# Patient Record
Sex: MALE | Race: WHITE | NOT HISPANIC OR LATINO | Employment: FULL TIME | URBAN - METROPOLITAN AREA
[De-identification: names, ages, dates, MRNs, and addresses within clinical notes are randomized per-mention and may not be internally consistent; named-entity substitution may affect disease eponyms.]

---

## 2018-07-30 ENCOUNTER — HOSPITAL ENCOUNTER (EMERGENCY)
Facility: HOSPITAL | Age: 28
Discharge: HOME | End: 2018-07-30
Attending: EMERGENCY MEDICINE | Admitting: EMERGENCY MEDICINE

## 2018-07-30 VITALS
WEIGHT: 160 LBS | RESPIRATION RATE: 18 BRPM | OXYGEN SATURATION: 99 % | TEMPERATURE: 98.5 F | HEART RATE: 74 BPM | SYSTOLIC BLOOD PRESSURE: 124 MMHG | DIASTOLIC BLOOD PRESSURE: 58 MMHG

## 2018-07-30 DIAGNOSIS — K02.9 DENTAL CARIES: ICD-10-CM

## 2018-07-30 DIAGNOSIS — K02.9 PAIN DUE TO DENTAL CARIES: Primary | ICD-10-CM

## 2018-07-30 DIAGNOSIS — S02.5XXA CHIPPED TOOTH: ICD-10-CM

## 2018-07-30 PROCEDURE — 99282 EMERGENCY DEPT VISIT SF MDM: CPT

## 2018-07-30 PROCEDURE — 96372 THER/PROPH/DIAG INJ SC/IM: CPT

## 2018-07-30 RX ORDER — PENICILLIN V POTASSIUM 500 MG/1
500 TABLET ORAL 4 TIMES DAILY
Qty: 40 TABLET | Refills: 0 | Status: SHIPPED | OUTPATIENT
Start: 2018-07-30 | End: 2018-08-06

## 2018-07-30 RX ORDER — KETOROLAC TROMETHAMINE 30 MG/ML
15 INJECTION, SOLUTION INTRAMUSCULAR; INTRAVENOUS ONCE
Status: COMPLETED | OUTPATIENT
Start: 2018-07-30 | End: 2018-07-30

## 2018-07-30 RX ORDER — PENICILLIN V POTASSIUM 500 MG/1
500 TABLET ORAL 4 TIMES DAILY
Qty: 40 TABLET | Refills: 0 | OUTPATIENT
Start: 2018-07-30 | End: 2018-08-06

## 2018-07-30 RX ADMIN — KETOROLAC TROMETHAMINE 15 MG: 30 INJECTION, SOLUTION INTRAMUSCULAR at 18:18

## 2018-07-30 NOTE — ED ATTENDING ATTESTATION
Diane Loja MD, saw and evaluated the patient  I have discussed the patient with the resident/non-physician practitioner and agree with the resident's/non-physician practitioner's findings, Plan of Care, and MDM as documented in the resident's/non-physician practitioner's note, except where noted  All available labs and Radiology studies were reviewed  At this point I agree with the current assessment done in the Emergency Department    I have conducted an independent evaluation of this patient a history and physical is as follows:   Pt broke a tooth a couple of weeks ago pt started with pain in area 2 days ago PE: alert broke tooth with caries MDM: will start antibiotics and nsaids    Critical Care Time  CritCare Time    Procedures

## 2018-07-30 NOTE — DISCHARGE INSTRUCTIONS
Acute Dental Trauma   WHAT YOU NEED TO KNOW:   Acute dental trauma is a serious injury to one or more parts of your mouth  Your injury may include damage to any of your teeth, the tooth socket, the tooth root, or your jaw  You can also have injuries to the soft tissues of your mouth  These include your tongue, cheeks, gums, and lips  Severe injuries can expose the soft pulp inside the tooth  DISCHARGE INSTRUCTIONS:   Call 911 for any of the following:   · You have trouble breathing  Return to the emergency department immediately if:   · You lose one or more of your teeth, or your tooth moves out of place  · You have severe bleeding in your mouth that does not stop  Contact your healthcare provider if:   · You have a fever  · You have new symptoms, or your symptoms become worse  · You feel pain when air gets in contact with your damaged tooth  · You have tooth pain when you eat foods that are hot, cold, sweet, or sour  · Your tooth's color becomes darker  · You have questions or concern about your condition or care  Medicines: You may  need any of the following:  · Antibiotics  help treat or prevent a bacterial infection  · Acetaminophen  decreases pain and fever  It is available without a doctor's order  Ask how much to take and how often to take it  Follow directions  Read the labels of all other medicines you are using to see if they also contain acetaminophen, or ask your doctor or pharmacist  Acetaminophen can cause liver damage if not taken correctly  Do not use more than 4 grams (4,000 milligrams) total of acetaminophen in one day  · Take your medicine as directed  Contact your healthcare provider if you think your medicine is not helping or if you have side effects  Tell him or her if you are allergic to any medicine  Keep a list of the medicines, vitamins, and herbs you take  Include the amounts, and when and why you take them   Bring the list or the pill bottles to follow-up visits  Carry your medicine list with you in case of an emergency  Self-care:   · Apply ice  on your jaw or cheek for 15 to 20 minutes every hour or as directed  Use an ice pack, or put crushed ice in a plastic bag  Cover it with a towel  Ice helps prevent tissue damage and decreases swelling and pain  · Do not use your damaged tooth  Chewing food on your damaged tooth may put too much pressure on it and worsen your injury  · Eat soft foods or drink liquids  Soft foods and liquids may be easier to eat until your injury heals  Soft foods include applesauce, pudding, mashed potatoes, gelatin, or ice cream      · Keep your wounds clean  Use prescribed mouthwash as directed or gargle with a salt water solution  Mix 1 teaspoon of salt and 1 cup of warm water  You can also clean your wounds with hydrogen peroxide swabs  Ask your healthcare provider for more information on how to clean your wounds  · Wear protective gear when you play sports  Always wear a helmet and mouth guard that meet safety standards  These will prevent damage to your gums, teeth, and the bones that support your mouth  Follow up with your healthcare provider as directed:  Write down your questions so you remember to ask them during your visits  © 2017 [address] Information is for End User's use only and may not be sold, redistributed or otherwise used for commercial purposes  All illustrations and images included in CareNotes® are the copyrighted property of [employer]  or [employer]  The above information is an  only  It is not intended as medical advice for individual conditions or treatments  Talk to your doctor, nurse or pharmacist before following any medical regimen to see if it is safe and effective for you  Dental Caries   WHAT YOU NEED TO KNOW:   Dental caries are also called cavities  Cavities are caused by bacteria   When the bacteria in tooth plaque (sticky film) mix with certain types of carbohydrate, this creates acid  The acid breaks down areas of enamel, which covers the outside of a tooth  This creates a small hole in the tooth called a cavity  DISCHARGE INSTRUCTIONS:   Return to the emergency department if:   · Your face, jaw, cheek, eye, or neck begin to swell  Contact your dentist if:   · You have a fever  · Your tooth pain gets worse  · You have questions or concerns about your condition or care  Follow up with your dentist as directed:  Write down your questions so you remember to ask them during your visits  Prevent dental caries:   · Brush your teeth at least 2 times a day with fluoride toothpaste  · Use dental floss to clean between your teeth at least once a day  · Rinse your mouth with water or mouthwash after meals and snacks  · Chew sugarless gum after meals and snacks  · See your dentist regularly for dental cleanings and oral exams  © 2017 [address] is for End User's use only and may not be sold, redistributed or otherwise used for commercial purposes  All illustrations and images included in CareNotes® are the copyrighted property of [employer]  or [employer]  The above information is an  only  It is not intended as medical advice for individual conditions or treatments  Talk to your doctor, nurse or pharmacist before following any medical regimen to see if it is safe and effective for you

## 2018-07-30 NOTE — ED PROVIDER NOTES
History  Chief Complaint   Patient presents with    Dental Pain     pt with hx of dental caries and dental pain  pt having dental pain in the front of his mouth  Patient is a 17-year-old male presenting to the emergency department for 48 hour history of dental pain, states began yesterday morning, describes it as constant dull and aching in nature, without associated fever and chills  He states he has a history of dental caries, but does not have the money to see a dentist   He said the pain it is occurring where he broke a tooth a few weeks ago, and he has had intermittent pain since this event  He denies trismus, muffled voice, neck pain, malaise, no odynophagia, dysphagia  He took ibuprofen this morning, without alleviation of his pain  No significant past medical history, takes no medications  Current smoker, denies alcohol and drugs  Denies chest pain, shortness of breath, nausea, vomiting, diarrhea, melena, hematochezia, cough, genitourinary symptoms  History provided by:  Patient   used: No    Dental Pain   Location:  Lower  Quality:  Aching and dull  Severity:  Moderate  Onset quality:  Gradual  Duration:  2 days  Timing:  Constant  Progression:  Unchanged  Chronicity:  Chronic  Context: dental caries, dental fracture and poor dentition    Context: not abscess, cap still on, not crown fracture, not enamel fracture, filling intact, not intrusion, not malocclusion, not recent dental surgery and not trauma    Relieved by:  Nothing  Worsened by:  Cold food/drink and hot food/drink  Ineffective treatments:  NSAIDs  Associated symptoms: no congestion, no drooling, no facial swelling, no fever, no headaches, no neck pain and no oral lesions    Risk factors: lack of dental care and smoking    Risk factors: no cancer, no chewing tobacco use, no diabetes and no immunosuppression        None       History reviewed  No pertinent past medical history      Past Surgical History: Procedure Laterality Date    APPENDECTOMY         History reviewed  No pertinent family history  I have reviewed and agree with the history as documented  Social History   Substance Use Topics    Smoking status: Current Every Day Smoker     Packs/day: 0 50     Types: Cigarettes    Smokeless tobacco: Never Used    Alcohol use No        Review of Systems   Constitutional: Negative  Negative for appetite change, chills, diaphoresis, fatigue, fever and unexpected weight change  HENT: Positive for dental problem  Negative for congestion, drooling, ear discharge, ear pain, facial swelling, hearing loss, mouth sores, nosebleeds, postnasal drip, rhinorrhea, sinus pain, sinus pressure, sore throat, tinnitus, trouble swallowing and voice change  Eyes: Negative  Negative for photophobia, pain, redness and visual disturbance  Respiratory: Negative  Negative for cough, choking, chest tightness, shortness of breath, wheezing and stridor  Cardiovascular: Negative  Negative for chest pain, palpitations and leg swelling  Gastrointestinal: Negative  Negative for abdominal pain, diarrhea, nausea and vomiting  Endocrine: Negative  Genitourinary: Negative  Musculoskeletal: Negative for back pain, joint swelling, myalgias, neck pain and neck stiffness  Skin: Negative  Allergic/Immunologic: Negative  Neurological: Negative for dizziness, facial asymmetry, weakness, light-headedness, numbness and headaches  Hematological: Negative  Psychiatric/Behavioral: Negative          Physical Exam  ED Triage Vitals [07/30/18 1724]   Temperature Pulse Respirations Blood Pressure SpO2   98 5 °F (36 9 °C) 62 18 117/62 99 %      Temp Source Heart Rate Source Patient Position - Orthostatic VS BP Location FiO2 (%)   Oral Monitor Lying Right arm --      Pain Score       8           Orthostatic Vital Signs  Vitals:    07/30/18 1724 07/30/18 1844   BP: 117/62 124/58   Pulse: 62 74   Patient Position - Orthostatic VS: Lying Sitting       Physical Exam   Constitutional: He is oriented to person, place, and time  He appears well-developed and well-nourished  No distress  HENT:   Head: Normocephalic and atraumatic  Right Ear: External ear normal    Left Ear: External ear normal    Nose: Nose normal    Mouth/Throat: Uvula is midline, oropharynx is clear and moist and mucous membranes are normal  He does not have dentures  No oral lesions  No trismus in the jaw  Abnormal dentition  Dental caries present  No dental abscesses, uvula swelling or lacerations  No oropharyngeal exudate or tonsillar abscesses  No tonsillar exudate  Poor dentition  Patient's right lower canine eroded down to gum line, brown discoloration without blood, or abscess  Dental caries   Eyes: Conjunctivae and EOM are normal  Pupils are equal, round, and reactive to light  Right eye exhibits no discharge  Left eye exhibits no discharge  No scleral icterus  Neck: Normal range of motion  No JVD present  No tracheal deviation present  No thyromegaly present  Cardiovascular: Normal rate, regular rhythm, normal heart sounds and intact distal pulses  Exam reveals no gallop and no friction rub  No murmur heard  Pulmonary/Chest: Effort normal and breath sounds normal  No stridor  No respiratory distress  He has no wheezes  He has no rales  He exhibits no tenderness  Abdominal: Soft  He exhibits no distension and no mass  There is no tenderness  There is no guarding  Musculoskeletal: Normal range of motion  He exhibits no edema, tenderness or deformity  Lymphadenopathy:     He has no cervical adenopathy  Neurological: He is alert and oriented to person, place, and time  No cranial nerve deficit  He exhibits normal muscle tone  Skin: Skin is warm and dry  Capillary refill takes less than 2 seconds  No rash noted  He is not diaphoretic  No erythema  No pallor  Psychiatric: He has a normal mood and affect   His behavior is normal    Nursing note and vitals reviewed  ED Medications  Medications   ketorolac (TORADOL) injection 15 mg (15 mg Intramuscular Given 7/30/18 1818)       Diagnostic Studies  Results Reviewed     None                 No orders to display         Procedures  Procedures      Phone Consults  ED Phone Contact    ED Course                               MDM  Number of Diagnoses or Management Options  Chipped tooth: new and does not require workup  Dental caries: new and does not require workup  Pain due to dental caries: new and does not require workup  Diagnosis management comments: 1  Patient presenting to the emergency department for dental pain  Patient has poor dentition and dental caries throughout  No abscesses on physical exam, patient has no trismus, odynophagia, dysphagia, dyspnea, fever or chills, no neck pain  Patient's physical exam findings and symptoms consistent with dental pain from dental caries  Due to patient's poor dentition and lack of follow-up, penicillin 500 mg 4 times a day for 7 days  Toradol 15 mg IM for pain  Patient notes improvement after Toradol  2   Instructed patient on the importance of seeing a dentist, included the contact information for several dentists in the area for the patient to contact  I instructed the patient to return to the emergency department if she had any worsening or new symptoms arise, including trismus, neck pain, fever chills, dysphagia or odynophagia  Understands and agrees to this plan          Amount and/or Complexity of Data Reviewed  Review and summarize past medical records: yes  Discuss the patient with other providers: yes      CritCare Time    Disposition  Final diagnoses:   Dental caries   Chipped tooth   Pain due to dental caries     Time reflects when diagnosis was documented in both MDM as applicable and the Disposition within this note     Time User Action Codes Description Comment    7/30/2018  6:35 PM Avie Lamp Add [K02 9] Dental caries     7/30/2018 6:36 PM Stan Shells Add [S02  5XXA] Chipped tooth     7/30/2018  6:36 PM Stan Shells Add [K02 9] Pain due to dental caries     7/30/2018  6:36 PM Stan Shells Modify [K02 9] Dental caries     7/30/2018  6:36 PM Stan Shells Modify [K02 9] Pain due to dental caries       ED Disposition     ED Disposition Condition Comment    Discharge  Harles Claude discharge to home/self care  Condition at discharge: Stable        Follow-up Information     Follow up With Specialties Details Why [address] Emergency Department Emergency Medicine Go to As needed, If symptoms worsen [address]  [phone]  ED, [address] [facility], [place], [address] [facility], [address]    Dentist  Schedule an appointment as soon as possible for a visit  [phone]           Discharge Medication List as of 7/30/2018  6:44 PM      START taking these medications    Details   penicillin V potassium (VEETID) 500 mg tablet Take 1 tablet (500 mg total) by mouth 4 (four) times a day for 7 days, Starting Mon 7/30/2018, Until Mon 8/6/2018, Print           No discharge procedures on file  ED Provider  Attending physically available and evaluated Harles Claude I managed the patient along with the ED Attending      Electronically Signed by         Maribell Ramachandran,   07/30/18 9188

## 2018-08-06 ENCOUNTER — HOSPITAL ENCOUNTER (EMERGENCY)
Facility: HOSPITAL | Age: 28
Discharge: HOME | End: 2018-08-06
Attending: EMERGENCY MEDICINE | Admitting: EMERGENCY MEDICINE

## 2018-08-06 VITALS
TEMPERATURE: 98 F | RESPIRATION RATE: 14 BRPM | DIASTOLIC BLOOD PRESSURE: 75 MMHG | SYSTOLIC BLOOD PRESSURE: 123 MMHG | WEIGHT: 138 LBS | HEART RATE: 84 BPM | OXYGEN SATURATION: 98 %

## 2018-08-06 DIAGNOSIS — K02.9 DENTAL CARIES: ICD-10-CM

## 2018-08-06 DIAGNOSIS — K04.7 DENTAL ABSCESS: Primary | ICD-10-CM

## 2018-08-06 DIAGNOSIS — K08.89 TOOTHACHE: ICD-10-CM

## 2018-08-06 PROCEDURE — 99282 EMERGENCY DEPT VISIT SF MDM: CPT

## 2018-08-06 RX ORDER — IBUPROFEN 600 MG/1
600 TABLET ORAL EVERY 6 HOURS PRN
Qty: 20 TABLET | Refills: 0 | Status: SHIPPED | OUTPATIENT
Start: 2018-08-06 | End: 2018-08-26

## 2018-08-06 RX ORDER — CLINDAMYCIN HYDROCHLORIDE 150 MG/1
300 CAPSULE ORAL EVERY 6 HOURS
Qty: 56 CAPSULE | Refills: 0 | Status: SHIPPED | OUTPATIENT
Start: 2018-08-06 | End: 2018-08-13

## 2018-08-06 RX ADMIN — LIDOCAINE HYDROCHLORIDE,EPINEPHRINE BITARTRATE: 10; .01 INJECTION, SOLUTION INFILTRATION; PERINEURAL at 14:32

## 2018-08-06 NOTE — ED PROVIDER NOTES
History  Chief Complaint   Patient presents with    Dental Swelling     right lower dental pain and swelling, finished penicillin abx, with no relief  History provided by:  Patient  Dental Pain   Location:  Lower  Lower teeth location: lateral incisor  Quality:  Aching  Severity:  Moderate  Onset quality:  Sudden  Duration:  10 days  Timing:  Constant  Progression:  Waxing and waning  Chronicity:  New  Context: abscess    Relieved by:  Nothing  Worsened by:  Cold food/drink, touching, jaw movement and pressure  Ineffective treatments:  NSAIDs (penicillin)  Associated symptoms: facial swelling and gum swelling    Associated symptoms: no congestion, no difficulty swallowing, no drooling, no facial pain, no fever, no headaches, no neck pain, no neck swelling, no oral bleeding, no oral lesions and no trismus    Risk factors: lack of dental care, periodontal disease and smoking    Risk factors: no alcohol problem, no cancer, no chewing tobacco use, no diabetes and no immunosuppression        Prior to Admission Medications   Prescriptions Last Dose Informant Patient Reported? Taking? penicillin V potassium (VEETID) 500 mg tablet   No No   Sig: Take 1 tablet (500 mg total) by mouth 4 (four) times a day for 7 days      Facility-Administered Medications: None       History reviewed  No pertinent past medical history  Past Surgical History:   Procedure Laterality Date    APPENDECTOMY         History reviewed  No pertinent family history  I have reviewed and agree with the history as documented  Social History   Substance Use Topics    Smoking status: Current Every Day Smoker     Packs/day: 0 50     Types: Cigarettes    Smokeless tobacco: Never Used    Alcohol use No        Review of Systems   Constitutional: Negative for activity change, appetite change, chills, diaphoresis, fatigue and fever  HENT: Positive for dental problem and facial swelling  Negative for congestion, drooling and mouth sores  Respiratory: Negative for chest tightness and shortness of breath  Cardiovascular: Negative for chest pain  Gastrointestinal: Negative for nausea  Musculoskeletal: Negative for neck pain  Skin: Negative for color change, pallor, rash and wound  Neurological: Negative for headaches  Psychiatric/Behavioral: Negative for confusion  All other systems reviewed and are negative  Physical Exam  Physical Exam   Constitutional: He is oriented to person, place, and time  He appears well-developed and well-nourished  No distress  HENT:   Head: Normocephalic  Right Ear: External ear normal    Left Ear: External ear normal    Nose: Nose normal    Mouth/Throat: Oropharynx is clear and moist  No oropharyngeal exudate  There is a dental abscess with associated gingivitis present over the right lower lateral incisor area  An I and D will be performed to assist drainage  Eyes: Conjunctivae are normal  Right eye exhibits no discharge  Left eye exhibits no discharge  Neck: Neck supple  No tracheal deviation present  No thyromegaly present  Cardiovascular: Normal rate, regular rhythm and normal heart sounds  Exam reveals no friction rub  No murmur heard  Pulmonary/Chest: Effort normal and breath sounds normal  No respiratory distress  He has no wheezes  He has no rales  Lymphadenopathy:     He has no cervical adenopathy  Neurological: He is alert and oriented to person, place, and time  Skin: Skin is warm  Capillary refill takes less than 2 seconds  He is not diaphoretic  Psychiatric: He has a normal mood and affect  His behavior is normal  Judgment and thought content normal    Nursing note and vitals reviewed        Vital Signs  ED Triage Vitals [08/06/18 1334]   Temperature Pulse Respirations Blood Pressure SpO2   98 °F (36 7 °C) 84 14 123/75 98 %      Temp Source Heart Rate Source Patient Position - Orthostatic VS BP Location FiO2 (%)   Temporal Monitor -- -- --      Pain Score       5 Vitals:    08/06/18 1334   BP: 123/75   Pulse: 84       Visual Acuity      ED Medications  Medications   lidocaine-epinephrine (XYLOCAINE/EPINEPHRINE) 1 %-1:100,000 10 mL, sodium bicarbonate 1 mEq infiltration ( Infiltration Given 8/6/18 1432)       Diagnostic Studies  Results Reviewed     None                 No orders to display              Procedures  Incision/Drainage  Date/Time: 8/6/2018 1:57 PM  Performed by: Jo-Ann Enriquez by: Raquel Franklin     Patient location:  ED  Other Assisting Provider: No    Consent:     Consent obtained:  Verbal    Consent given by:  Patient    Risks discussed:  Bleeding, incomplete drainage and pain    Alternatives discussed:  No treatment  Universal protocol:     Procedure explained and questions answered to patient or proxy's satisfaction: yes      Site/side marked: yes      Immediately prior to procedure a time out was called: yes    Location:     Type:  Abscess    Size:  Apical right lateral incisor    Location:  Mouth    Mouth location: tooth  Anesthesia (see MAR for exact dosages): Anesthesia method:  Local infiltration    Local anesthetic:  Lidocaine 1% WITH epi  Procedure details:     Complexity:  Simple    Needle aspiration: no      Incision types:  Stab incision    Scalpel blade:  11    Approach:  Open    Laceration depth: Gingiva  Drainage:  Bloody and purulent    Drainage amount: Moderate    Wound treatment:  Wound left open    Packing materials:  None  Post-procedure details:     Patient tolerance of procedure:   Tolerated well, no immediate complications           Phone Contacts  ED Phone Contact    ED Course                               MDM  Number of Diagnoses or Management Options  Dental abscess: new and does not require workup  Dental caries: new and does not require workup  Toothache: new and does not require workup  Risk of Complications, Morbidity, and/or Mortality  Presenting problems: moderate  Diagnostic procedures: moderate  Management options: moderate    Patient Progress  Patient progress: stable    CritCare Time    Disposition  Final diagnoses:   Dental abscess   Toothache   Dental caries     Time reflects when diagnosis was documented in both MDM as applicable and the Disposition within this note     Time User Action Codes Description Comment    8/6/2018  1:59 PM Kelby [place] Add [K04 7] Dental abscess     8/6/2018  1:59 PM Renae Nieves [K08 89] Toothache     8/6/2018  1:59 PM Maynor Peters [K02 9] Dental caries       ED Disposition     ED Disposition Condition Comment    Discharge  Oval Sabins discharge to home/self care  Condition at discharge: Good        Follow-up Information     Follow up With Specialties Details Why Contact Info    Your dentist  In 1 week            Discharge Medication List as of 8/6/2018  2:01 PM      START taking these medications    Details   clindamycin (CLEOCIN) 150 mg capsule Take 2 capsules (300 mg total) by mouth every 6 (six) hours for 7 days, Starting Mon 8/6/2018, Until Mon 8/13/2018, Print      ibuprofen (MOTRIN) 600 mg tablet Take 1 tablet (600 mg total) by mouth every 6 (six) hours as needed for mild pain for up to 20 days, Starting Mon 8/6/2018, Until Sun 8/26/2018, Print         CONTINUE these medications which have NOT CHANGED    Details   penicillin V potassium (VEETID) 500 mg tablet Take 1 tablet (500 mg total) by mouth 4 (four) times a day for 7 days, Starting Mon 7/30/2018, Until Mon 8/6/2018, Print           No discharge procedures on file      ED Provider  Electronically Signed by           Luis Golden PA-C  08/06/18 1432

## 2018-08-06 NOTE — DISCHARGE INSTRUCTIONS
Dental Abscess   WHAT YOU NEED TO KNOW:   A dental abscess is a collection of pus in or around a tooth  A dental abscess is caused by bacteria  The bacteria usually enter the tooth when the enamel (outer part of the tooth) is damaged by tooth decay  Bacteria may also enter the tooth through a break or chip in the tooth, or a cut in the gum  Food particles that are stuck between the teeth for a long time may also lead to an abscess  DISCHARGE INSTRUCTIONS:   Return to the emergency department if:   · You have severe pain  · You have trouble breathing because of pain or swelling  Contact your healthcare provider if:   · Your symptoms get worse, even after treatment  · Your mouth is bleeding  · You cannot eat or drink because of pain or swelling  · Your abscess returns  · You have an injury that causes a crack in your tooth  · You have questions or concerns about your condition or care  Medicines: You may  need any of the following:  · Antibiotics  help treat a bacterial infection  · NSAIDs , such as ibuprofen, help decrease swelling, pain, and fever  This medicine is available with or without a doctor's order  NSAIDs can cause stomach bleeding or kidney problems in certain people  If you take blood thinner medicine, always ask your healthcare provider if NSAIDs are safe for you  Always read the medicine label and follow directions  · Acetaminophen  decreases pain and fever  It is available without a doctor's order  Ask how much to take and how often to take it  Follow directions  Read the labels of all other medicines you are using to see if they also contain acetaminophen, or ask your doctor or pharmacist  Acetaminophen can cause liver damage if not taken correctly  Do not use more than 4 grams (4,000 milligrams) total of acetaminophen in one day  · Prescription pain medicine  may be given  Ask your healthcare provider how to take this medicine safely   Some prescription pain medicines contain acetaminophen  Do not take other medicines that contain acetaminophen without talking to your healthcare provider  Too much acetaminophen may cause liver damage  Prescription pain medicine may cause constipation  Ask your healthcare provider how to prevent or treat constipation  · Take your medicine as directed  Contact your healthcare provider if you think your medicine is not helping or if you have side effects  Tell him of her if you are allergic to any medicine  Keep a list of the medicines, vitamins, and herbs you take  Include the amounts, and when and why you take them  Bring the list or the pill bottles to follow-up visits  Carry your medicine list with you in case of an emergency  Self-care:   · Rinse your mouth every 2 hours with salt water  This will help keep the area clean  · Gently brush your teeth twice a day with a soft tooth brush  This will help keep the area clean  · Eat soft foods as directed  Soft foods may cause less pain  Examples include applesauce, yogurt, and cooked pasta  Ask your healthcare provider how long to follow this instruction  · Apply a warm compress to your tooth or gum  Use a cotton ball or gauze soaked in warm water  Remove the compress in 10 minutes or when it becomes cool  Repeat 3 times a day  Prevent another abscess:   · Brush your teeth at least 2 times a day with fluoride toothpaste  · Use dental floss to clean between your teeth at least once a day  · Rinse your mouth with water or mouthwash after meals and snacks  · Chew sugarless gum after meals and snacks  · Limit foods that are sticky and high in sugar such as raisons  Also limit drinks high in sugar, such as soda  · See your dentist every 6 months for dental cleanings and oral exams  Follow up with your healthcare provider in 24 hours: Your healthcare provider will need to check your teeth and gums   Write down your questions so you remember to ask them during your visits  © 2017 [employer] Information is for End User's use only and may not be sold, redistributed or otherwise used for commercial purposes  All illustrations and images included in CareNotes® are the copyrighted property of [employer]  or [employer]  The above information is an  only  It is not intended as medical advice for individual conditions or treatments  Talk to your doctor, nurse or pharmacist before following any medical regimen to see if it is safe and effective for you